# Patient Record
Sex: FEMALE | Race: WHITE | Employment: OTHER | URBAN - METROPOLITAN AREA
[De-identification: names, ages, dates, MRNs, and addresses within clinical notes are randomized per-mention and may not be internally consistent; named-entity substitution may affect disease eponyms.]

---

## 2017-06-24 ENCOUNTER — HOSPITAL ENCOUNTER (OUTPATIENT)
Age: 72
Discharge: HOME | End: 2017-06-24
Attending: FAMILY MEDICINE
Payer: MEDICARE

## 2017-06-24 VITALS
DIASTOLIC BLOOD PRESSURE: 81 MMHG | TEMPERATURE: 98 F | BODY MASS INDEX: 26.5 KG/M2 | HEART RATE: 86 BPM | SYSTOLIC BLOOD PRESSURE: 134 MMHG | RESPIRATION RATE: 18 BRPM | WEIGHT: 135 LBS | HEIGHT: 60 IN | OXYGEN SATURATION: 96 %

## 2017-06-24 DIAGNOSIS — N30.01 ACUTE CYSTITIS WITH HEMATURIA: Primary | ICD-10-CM

## 2017-06-24 DIAGNOSIS — R10.30 LOWER ABDOMINAL PAIN: ICD-10-CM

## 2017-06-24 PROCEDURE — 87186 SC STD MICRODIL/AGAR DIL: CPT | Performed by: FAMILY MEDICINE

## 2017-06-24 PROCEDURE — 99204 OFFICE O/P NEW MOD 45 MIN: CPT

## 2017-06-24 PROCEDURE — 81002 URINALYSIS NONAUTO W/O SCOPE: CPT | Performed by: FAMILY MEDICINE

## 2017-06-24 PROCEDURE — 87086 URINE CULTURE/COLONY COUNT: CPT | Performed by: FAMILY MEDICINE

## 2017-06-24 PROCEDURE — 87077 CULTURE AEROBIC IDENTIFY: CPT | Performed by: FAMILY MEDICINE

## 2017-06-24 RX ORDER — NITROFURANTOIN 25; 75 MG/1; MG/1
100 CAPSULE ORAL 2 TIMES DAILY
Qty: 14 CAPSULE | Refills: 0 | Status: SHIPPED | OUTPATIENT
Start: 2017-06-24 | End: 2017-06-26

## 2017-06-24 NOTE — ED INITIAL ASSESSMENT (HPI)
Burning and urgency and frequency with urination started last night. Feeling pressure in bladder. No fever.

## 2017-06-24 NOTE — ED PROVIDER NOTES
Patient Seen in: [id number] [facility] [place]    History   Patient presents with:  Urinary Symptoms (urologic)    Stated Complaint: Poss UTI    HPI  36-year-old female coming in with complaints of urinary urgency frequency and hesitancy, has had multi Tab,  Take 20 mg by mouth 2 (two) times daily. GuaiFENesin ER (MUCINEX) 600 MG Oral Tablet 12 Hr,  Take 1,200 mg by mouth 2 (two) times daily. Triamcinolone Acetonide (KENALOG) 0.025 % Apply Externally Ointment,  Apply  topically 2 (two) times daily. normal limits   URINE CULTURE, ROUTINE       Orders Placed This Encounter      POCT urinalysis dipstick Once      Urine Culture, Routine Once      Nitrofurantoin Monohyd Macro 100 MG Oral Cap          Sig: Take 1 capsule (100 mg total) by mouth 2 (two) [place]

## 2017-06-26 RX ORDER — CEPHALEXIN 500 MG/1
500 CAPSULE ORAL 2 TIMES DAILY
Qty: 14 CAPSULE | Refills: 0 | Status: SHIPPED | OUTPATIENT
Start: 2017-06-26 | End: 2017-07-03

## 2017-06-26 NOTE — ED NOTES
Patient called and not feeling as well as she expected to by now. Dr. Pravin Ritter to be notified with final lab result for medication change due to sensitivities.

## 2017-06-26 NOTE — ED NOTES
Noted. Patient instructed to stop other antibiotic and start new antibiotic today. Patient agreed to plan of care.

## 2017-07-03 ENCOUNTER — HOSPITAL ENCOUNTER (OUTPATIENT)
Age: 72
Discharge: HOME | End: 2017-07-03
Attending: FAMILY MEDICINE
Payer: MEDICARE

## 2017-07-03 VITALS
DIASTOLIC BLOOD PRESSURE: 80 MMHG | TEMPERATURE: 97 F | SYSTOLIC BLOOD PRESSURE: 150 MMHG | RESPIRATION RATE: 28 BRPM | OXYGEN SATURATION: 97 % | HEART RATE: 94 BPM

## 2017-07-03 DIAGNOSIS — N30.00 ACUTE CYSTITIS WITHOUT HEMATURIA: Primary | ICD-10-CM

## 2017-07-03 LAB
POCT BILIRUBIN URINE: NEGATIVE
POCT GLUCOSE URINE: NEGATIVE MG/DL
POCT KETONE URINE: NEGATIVE MG/DL
POCT NITRITE URINE: NEGATIVE
POCT PH URINE: 5.5 (ref 5–8)
POCT SPECIFIC GRAVITY URINE: 1
POCT URINE COLOR: YELLOW
POCT UROBILINOGEN URINE: 0.2 MG/DL

## 2017-07-03 PROCEDURE — 87086 URINE CULTURE/COLONY COUNT: CPT | Performed by: FAMILY MEDICINE

## 2017-07-03 PROCEDURE — 81002 URINALYSIS NONAUTO W/O SCOPE: CPT | Performed by: FAMILY MEDICINE

## 2017-07-03 PROCEDURE — 99214 OFFICE O/P EST MOD 30 MIN: CPT

## 2017-07-03 RX ORDER — CIPROFLOXACIN 500 MG/1
500 TABLET, FILM COATED ORAL 2 TIMES DAILY
Qty: 14 TABLET | Refills: 0 | Status: SHIPPED | OUTPATIENT
Start: 2017-07-03 | End: 2017-07-10

## 2017-07-03 NOTE — ED INITIAL ASSESSMENT (HPI)
Patient states she was here on 6/24 and was treated for UTI. States she had her abx changed when the culture was resulted. Patient completed the medication and felt fine for a couple of days. States today, all her symptoms returned.  Patient having burning

## 2017-07-03 NOTE — ED PROVIDER NOTES
Patient presents with:  Urinary Symptoms (urologic)    HPI:     Anna Marie Tinajero is a 80year old female who presents with suspected symptoms of UTI  Patient was recently treated for symptoms of UTI about 10 days ago.   She was initially prescribed Macrobid adenopathy and supple, symmetrical, trachea midline  Back: symmetric, no curvature. ROM normal. No CVA tenderness.   Lungs: clear to auscultation bilaterally  Heart: S1, S2 normal, no murmur, click, rub or gallop, regular rate and rhythm  Abdomen: soft, non urine  Monitor urine output and fluid intake  Present to ED for any worsening abdominal pain, hematuria, difficulty urination, acute urinary retention, worsening flank pain, nausea, vomiting    All results reviewed and discussed with patient.   See AVS for

## 2017-09-03 ENCOUNTER — HOSPITAL ENCOUNTER (OUTPATIENT)
Age: 72
Discharge: HOME | End: 2017-09-03
Payer: MEDICARE

## 2017-09-03 VITALS
OXYGEN SATURATION: 98 % | SYSTOLIC BLOOD PRESSURE: 143 MMHG | WEIGHT: 125 LBS | RESPIRATION RATE: 16 BRPM | HEART RATE: 89 BPM | TEMPERATURE: 98 F | DIASTOLIC BLOOD PRESSURE: 106 MMHG | BODY MASS INDEX: 24 KG/M2

## 2017-09-03 DIAGNOSIS — N30.01 ACUTE CYSTITIS WITH HEMATURIA: Primary | ICD-10-CM

## 2017-09-03 LAB
POCT BILIRUBIN URINE: NEGATIVE
POCT GLUCOSE URINE: NEGATIVE MG/DL
POCT KETONE URINE: NEGATIVE MG/DL
POCT NITRITE URINE: NEGATIVE
POCT PH URINE: 6 (ref 5–8)
POCT PROTEIN URINE: NEGATIVE MG/DL
POCT SPECIFIC GRAVITY URINE: 1
POCT URINE CLARITY: CLEAR
POCT URINE COLOR: YELLOW
POCT UROBILINOGEN URINE: 0.2 MG/DL

## 2017-09-03 PROCEDURE — 99214 OFFICE O/P EST MOD 30 MIN: CPT

## 2017-09-03 PROCEDURE — 81002 URINALYSIS NONAUTO W/O SCOPE: CPT | Performed by: PHYSICIAN ASSISTANT

## 2017-09-03 PROCEDURE — 87086 URINE CULTURE/COLONY COUNT: CPT | Performed by: PHYSICIAN ASSISTANT

## 2017-09-03 RX ORDER — SULFAMETHOXAZOLE AND TRIMETHOPRIM 800; 160 MG/1; MG/1
1 TABLET ORAL 2 TIMES DAILY
Qty: 14 TABLET | Refills: 0 | Status: SHIPPED | OUTPATIENT
Start: 2017-09-03 | End: 2017-09-10

## 2017-09-03 NOTE — ED PROVIDER NOTES
Patient Seen in: [id number] [facility]    History   Patient presents with:  Urinary Symptoms (urologic)    Stated Complaint: uti    HPI    Patient is a very pleasant 31-year-old female.   Patient awoke this morning with new onset urinary frequen Monohydrate (SPIRIVA HANDIHALER) 18 MCG Inhalation Cap,  Inhale 18 mcg into the lungs daily. Triamcinolone Acetonide (NASACORT ALLERGY 24HR) 55 MCG/ACT Nasal Aerosol,  by Nasal route.    famoTIDine (PEPCID) 20 MG Oral Tab,  Take 20 mg by mouth 2 (two) douglas infection. Neuro: Cranial nerves intact, Normal Gait.     ED Course     Labs Reviewed   POCT URINALYSIS DIPSTICK - Abnormal; Notable for the following:        Result Value    Blood, Urine Moderate (*)     Leukocyte esterase urine Large (*)     All other c